# Patient Record
Sex: MALE | Race: OTHER | HISPANIC OR LATINO | Employment: FULL TIME | URBAN - METROPOLITAN AREA
[De-identification: names, ages, dates, MRNs, and addresses within clinical notes are randomized per-mention and may not be internally consistent; named-entity substitution may affect disease eponyms.]

---

## 2018-08-22 ENCOUNTER — OFFICE VISIT (OUTPATIENT)
Dept: URGENT CARE | Age: 38
End: 2018-08-22
Payer: COMMERCIAL

## 2018-08-22 VITALS
BODY MASS INDEX: 34.61 KG/M2 | HEART RATE: 72 BPM | RESPIRATION RATE: 18 BRPM | SYSTOLIC BLOOD PRESSURE: 134 MMHG | HEIGHT: 69 IN | WEIGHT: 233.69 LBS | DIASTOLIC BLOOD PRESSURE: 85 MMHG | OXYGEN SATURATION: 95 % | TEMPERATURE: 98 F

## 2018-08-22 DIAGNOSIS — J06.9 ACUTE URI: Primary | ICD-10-CM

## 2018-08-22 PROCEDURE — G0383 LEV 4 HOSP TYPE B ED VISIT: HCPCS | Performed by: FAMILY MEDICINE

## 2018-08-22 RX ORDER — BENZONATATE 100 MG/1
100 CAPSULE ORAL 3 TIMES DAILY PRN
Qty: 20 CAPSULE | Refills: 0 | Status: SHIPPED | OUTPATIENT
Start: 2018-08-22 | End: 2018-10-22 | Stop reason: ALTCHOICE

## 2018-08-22 NOTE — PATIENT INSTRUCTIONS
As we discussed your illness is viral   No antibiotics are indicated at this time  Rest and drink extra fluids  Cough medication as prescribed  Tylenol or Motrin as needed for pain or fever  Salt water gargles and throat lozenges for sore throat  Follow up with PCP if no improvement  Go to ER with worsening symptoms, fevers or worsening cough  Cold Symptoms   WHAT YOU NEED TO KNOW:   A cold is an infection caused by a virus  The infection causes your upper respiratory system to become inflamed  Common symptoms of a cold include sneezing, dry throat, a stuffy nose, headache, watery eyes, and a cough  Your cough may be dry, or you may cough up mucus  You may also have muscle aches, joint pain, and tiredness  Rarely, you may have a fever  Most colds go away without treatment  DISCHARGE INSTRUCTIONS:   Return to the emergency department if:   · You have increased tiredness and weakness  · You are unable to eat  · Your heart is beating much faster than usual for you  · You see white spots in the back of your throat and your neck is swollen and sore to the touch  · You see pinpoint or larger reddish-purple dots on your skin  Contact your healthcare provider if:   · You have a fever higher than 102°F (38 9°C)  · You have new or worsening shortness of breath  · You have thick nasal drainage for more than 2 days  · Your symptoms do not improve or get worse within 5 days  · You have questions or concerns about your condition or care  Medicines: The following medicines may be suggested by your healthcare provider to decrease your cold symptoms  These medicines are available without a doctor's order  Ask which medicines to take and when to take them  Follow directions  · NSAIDs or acetaminophen  help to bring down a fever or decrease pain  · Decongestants  help decrease nasal stuffiness  · Antihistamines  help decrease sneezing and a runny nose       · Cough suppressants  help decrease how much you cough  · Expectorants  help loosen mucus so you can cough it up  · Take your medicine as directed  Contact your healthcare provider if you think your medicine is not helping or if you have side effects  Tell him of her if you are allergic to any medicine  Keep a list of the medicines, vitamins, and herbs you take  Include the amounts, and when and why you take them  Bring the list or the pill bottles to follow-up visits  Carry your medicine list with you in case of an emergency  Symptom relief: The following may help relieve cold symptoms, such as a dry throat and congestion:  · Gargle with mouthwash or warm salt water as directed  · Suck on throat lozenges or hard candy  · Use a cold or warm vaporizer or humidifier to ease your breathing  · Rest for at least 2 days and then as needed to decrease tiredness and weakness  · Use petroleum based jelly around your nostrils to decrease irritation from blowing your nose  Drink liquids:  Liquids will help thin and loosen thick mucus so you can cough it up  Liquids will also keep you hydrated  Ask your healthcare provider which liquids are best for you and how much to drink each day  Prevent the spread of germs: You can spread your cold germs to others for at least 3 days after your symptoms start  Wash your hands often  Do not share items, such as eating utensils  Cover your nose and mouth when you cough or sneeze using the crook of your elbow instead of your hands  Throw used tissues in the garbage  Do not smoke:  Smoking may worsen your symptoms and increase the length of time you feel sick  Talk with your healthcare provider if you need help to stop smoking  Follow up with your healthcare provider as directed:  Write down your questions so you remember to ask them during your visits     © 2017 [employer] Information is for End User's use only and may not be sold, redistributed or otherwise used for commercial purposes  All illustrations and images included in CareNotes® are the copyrighted property of [employer]  or [employer]  The above information is an  only  It is not intended as medical advice for individual conditions or treatments  Talk to your doctor, nurse or pharmacist before following any medical regimen to see if it is safe and effective for you

## 2018-08-22 NOTE — PROGRESS NOTES
[facility]        NAME: Isaiah Rodríguez is a 43 y o  male  : 1980    MRN: [id number]  DATE: 2018  TIME: 11:06 AM    Assessment and Plan   Acute URI [J06 9]  1  Acute URI  benzonatate (TESSALON PERLES) 100 mg capsule         Patient Instructions     Patient Instructions   As we discussed your illness is viral   No antibiotics are indicated at this time  Rest and drink extra fluids  Cough medication as prescribed  Tylenol or Motrin as needed for pain or fever  Salt water gargles and throat lozenges for sore throat  Follow up with PCP if no improvement  Go to ER with worsening symptoms, fevers or worsening cough  Chief Complaint     Chief Complaint   Patient presents with    Cold Like Symptoms     x 5 days, cough ,chest congestion and runny nose  History of Present Illness   Isaiah Rodríguez presents to the clinic c/o    This is a 43year old male here today with cough and congestion  Symptoms started about 5 days ago  No fevers  He states he has had some wheezing in his chest   He states today he is feeling better  He denies ear pain or sore throat  Using OTC cough and cold medication as needed  Wife and daughter have similar symptoms  Review of Systems   Review of Systems   Constitutional: Positive for fatigue  Negative for fever  HENT: Positive for congestion  Negative for sinus pain and sinus pressure  Respiratory: Positive for cough and wheezing  Negative for chest tightness  Gastrointestinal: Negative  Skin: Negative  Neurological: Negative  Psychiatric/Behavioral: Negative  Current Medications     No long-term prescriptions on file         Current Allergies     Allergies as of 2018    (No Known Allergies)            The following portions of the patient's history were reviewed and updated as appropriate: allergies, current medications, past family history, past medical history, past social history, past surgical history and problem list     Objective   /85   Pulse 72   Temp 98 °F (36 7 °C) (Temporal)   Resp 18   Ht 5' 9 29" (1 76 m)   Wt 106 kg (233 lb 11 oz)   SpO2 95%   BMI 34 22 kg/m²        Physical Exam     Physical Exam   Constitutional: He is oriented to person, place, and time  He appears well-developed  HENT:   Head: Normocephalic  Right Ear: External ear normal    Left Ear: External ear normal    Mouth/Throat: Oropharynx is clear and moist    Neck: Normal range of motion  Neck supple  Cardiovascular: Normal rate, regular rhythm and normal heart sounds  Pulmonary/Chest: Effort normal and breath sounds normal    Neurological: He is alert and oriented to person, place, and time  Psychiatric: He has a normal mood and affect  His behavior is normal    Nursing note and vitals reviewed

## 2018-10-22 ENCOUNTER — OFFICE VISIT (OUTPATIENT)
Dept: FAMILY MEDICINE CLINIC | Facility: CLINIC | Age: 38
End: 2018-10-22
Payer: COMMERCIAL

## 2018-10-22 VITALS
HEIGHT: 69 IN | RESPIRATION RATE: 18 BRPM | OXYGEN SATURATION: 96 % | HEART RATE: 92 BPM | TEMPERATURE: 98.4 F | WEIGHT: 247.8 LBS | DIASTOLIC BLOOD PRESSURE: 82 MMHG | SYSTOLIC BLOOD PRESSURE: 130 MMHG | BODY MASS INDEX: 36.7 KG/M2

## 2018-10-22 DIAGNOSIS — K52.9 GASTROENTERITIS: Primary | ICD-10-CM

## 2018-10-22 PROCEDURE — 99202 OFFICE O/P NEW SF 15 MIN: CPT | Performed by: FAMILY MEDICINE

## 2018-10-22 PROCEDURE — 1036F TOBACCO NON-USER: CPT | Performed by: FAMILY MEDICINE

## 2018-10-22 PROCEDURE — 3008F BODY MASS INDEX DOCD: CPT | Performed by: FAMILY MEDICINE

## 2018-10-22 NOTE — PROGRESS NOTES
Assessment/Plan:      Diagnoses and all orders for this visit:    Gastroenteritis  Discussed with the patient that his symptoms are most likely caused by gastroenteritis  Patient's symptoms have improved  Patient instructed to make sure that he is drinking plenty of fluids  Patient instructed to eat a bland diet for the next few days  Patient instructed to follow-up if symptoms get worse or do not continue to get better  Patient instructed to follow-up for a wellness visit  Subjective:     Patient ID: Silvino Moran is a 43 y o  male  Patient is here to establish care  Patient reports upset stomach and that he vomited once on Thursday night  Patient reports that his lunch might not have been fresh  Patient reports watery diarrhea since Friday morning  Patient reports that he was having diarrhea about 7 times a day for the past few days  Denies any fever, nausea, abdominal pain, or blood in the stool  Patient reports that he drank pedialyte OTC  Patient reports that he has been taking a probiotic OTC  PPatient reports that he has been watching his diet  Patient reports that he has not had diarrhea since last night  Patient reports that he feels better today  Review of Systems   Constitutional: Negative for chills and fever  HENT: Negative for congestion, ear pain, sinus pressure, sore throat and trouble swallowing  Eyes: Negative for pain, discharge and redness  Respiratory: Negative for cough, chest tightness, shortness of breath and wheezing  Cardiovascular: Negative for chest pain, palpitations and leg swelling  Gastrointestinal:        As noted in HPI  Genitourinary: Negative for dysuria, frequency, hematuria and urgency  Musculoskeletal: Negative for arthralgias, myalgias and neck pain  Skin: Negative for rash  Neurological: Negative for dizziness, seizures, syncope, light-headedness, numbness and headaches     Psychiatric/Behavioral: Negative for suicidal ideas         Denies any depression  Objective:     Physical Exam   Constitutional: He is oriented to person, place, and time  No distress  HENT:   Right Ear: External ear normal    Left Ear: External ear normal    Nose: Nose normal    Mouth/Throat: Oropharynx is clear and moist    Tympanic membranes and ear canals wnl  Eyes: Pupils are equal, round, and reactive to light  Conjunctivae are normal  Right eye exhibits no discharge  Left eye exhibits no discharge  Neck: Normal range of motion  Cardiovascular: Normal rate, regular rhythm, normal heart sounds and intact distal pulses  No edema noted  Pulmonary/Chest: Effort normal and breath sounds normal  No respiratory distress  He has no wheezes  Abdominal: Soft  Bowel sounds are normal  He exhibits no distension and no mass  There is no tenderness  Musculoskeletal:   Gait wnl  Lymphadenopathy:     He has no cervical adenopathy  Neurological: He is alert and oriented to person, place, and time  No cranial nerve deficit  Skin: No rash noted  Psychiatric: He has a normal mood and affect  Vitals reviewed

## 2019-08-08 ENCOUNTER — OFFICE VISIT (OUTPATIENT)
Dept: FAMILY MEDICINE CLINIC | Facility: CLINIC | Age: 39
End: 2019-08-08
Payer: COMMERCIAL

## 2019-08-08 VITALS
RESPIRATION RATE: 16 BRPM | HEIGHT: 69 IN | DIASTOLIC BLOOD PRESSURE: 84 MMHG | OXYGEN SATURATION: 98 % | WEIGHT: 249 LBS | HEART RATE: 76 BPM | BODY MASS INDEX: 36.88 KG/M2 | SYSTOLIC BLOOD PRESSURE: 122 MMHG

## 2019-08-08 DIAGNOSIS — F41.9 ANXIETY DISORDER, UNSPECIFIED TYPE: Primary | ICD-10-CM

## 2019-08-08 DIAGNOSIS — F51.04 PSYCHOPHYSIOLOGICAL INSOMNIA: ICD-10-CM

## 2019-08-08 DIAGNOSIS — Z00.00 PHYSICAL EXAM, ANNUAL: ICD-10-CM

## 2019-08-08 PROBLEM — G47.00 INSOMNIA: Status: ACTIVE | Noted: 2019-08-08

## 2019-08-08 PROBLEM — K52.9 GASTROENTERITIS: Status: RESOLVED | Noted: 2018-10-22 | Resolved: 2019-08-08

## 2019-08-08 PROCEDURE — 1036F TOBACCO NON-USER: CPT | Performed by: FAMILY MEDICINE

## 2019-08-08 PROCEDURE — 3008F BODY MASS INDEX DOCD: CPT | Performed by: FAMILY MEDICINE

## 2019-08-08 PROCEDURE — 99214 OFFICE O/P EST MOD 30 MIN: CPT | Performed by: FAMILY MEDICINE

## 2019-08-08 RX ORDER — LORAZEPAM 0.5 MG/1
0.5 TABLET ORAL EVERY 8 HOURS PRN
Qty: 30 TABLET | Refills: 0 | Status: SHIPPED | OUTPATIENT
Start: 2019-08-08 | End: 2019-09-06

## 2019-08-08 RX ORDER — SERTRALINE HYDROCHLORIDE 25 MG/1
TABLET, FILM COATED ORAL
Qty: 30 TABLET | Refills: 1 | Status: SHIPPED | OUTPATIENT
Start: 2019-08-08 | End: 2019-09-06 | Stop reason: SDUPTHER

## 2019-08-08 NOTE — ASSESSMENT & PLAN NOTE
- long discussion with patient in regards to anxiety triggers and things that he is able to control  Obviously his daughter is in  so she is being supervised throughout the course of the day which the patient has to be accepting of    -patient will continue to have virtual visits with his therapist but will be started on sertraline 12 5 mg once daily x1 week then increasing to 25 mg once daily  He was given a limited supply of p r n  Lorazepam to take as needed for anxiety attacks as well as insomnia  -patient will follow up in 2-3 weeks for re-evaluation of anxiety and insomnia      - check thyroid function study, electrolyte panel, CBC as we do not have any records of any recent blood work for the patient

## 2019-08-08 NOTE — PROGRESS NOTES
Subjective:      Patient ID: Willy Vázquez is a 35 y o  male  50-year-old male presents for evaluation generalized anxiety disorder and panic attacks  Patient states that he has a longstanding history of anxiety dating back to 2010  Patient does have a therapist that he does interact with on a regular basis from [facility]  Patient had moved to the [place] from [facility] 2 years ago for work but has kept in touch with his therapist through virtual visits  Patient has never been on anti anxiety medication  Patient states that 1 of the triggers for his anxiety is that he likes to be in control situations  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  Patient states that his father recently passed away in December this past year and he has a 3-3/1year-old daughter who has been diagnosed with asthma  His daughter has had several episodes being diagnosed with pneumonia  Daughter intends  and the patient works from home  He states that many times he is finding himself contacting the  or checking on a phone after to make certain that his daughter is doing well  He has difficulty sleeping at night and feels very restless  Patient has never had blood work performed carotids  Patient states that last set of labs were done approximately 2 years ago before he immigrated to the [facility]      No past medical history on file  Family History   Problem Relation Age of Onset    Diabetes Paternal Grandmother     Hypertension Mother     Anxiety disorder Mother     Dementia Father     Autism Brother        Past Surgical History:   Procedure Laterality Date    CHOLECYSTECTOMY          reports that he has never smoked  He has never used smokeless tobacco  He reports that he drinks alcohol  He reports that he does not use drugs        Current Outpatient Medications:     LORazepam (ATIVAN) 0 5 mg tablet, Take 1 tablet (0 5 mg total) by mouth every 8 (eight) hours as needed for anxiety, Disp: 30 tablet, Rfl: 0    sertraline (ZOLOFT) 25 mg tablet, 1/2 tablet daily x7 days then 1 tablet daily, Disp: 30 tablet, Rfl: 1    The following portions of the patient's history were reviewed and updated as appropriate: allergies, current medications, past family history, past medical history, past social history, past surgical history and problem list     Review of Systems   Constitutional: Negative  Cardiovascular: Negative for palpitations  Psychiatric/Behavioral: Positive for dysphoric mood and sleep disturbance  The patient is nervous/anxious  Objective:    /84   Pulse 76   Resp 16   Ht 5' 9" (1 753 m)   Wt 113 kg (249 lb)   SpO2 98%   BMI 36 77 kg/m²      Physical Exam   Constitutional: He is oriented to person, place, and time  He appears well-developed and well-nourished  Obese   Eyes: Pupils are equal, round, and reactive to light  Conjunctivae and EOM are normal    Neck: Normal range of motion  Neck supple  No thyromegaly present  Cardiovascular: Normal rate, regular rhythm and normal heart sounds  Neurological: He is alert and oriented to person, place, and time  Psychiatric: He has a normal mood and affect  His behavior is normal  Judgment and thought content normal    Nursing note and vitals reviewed  No results found for this or any previous visit (from the past 1008 hour(s))  Assessment/Plan:    Anxiety disorder  - long discussion with patient in regards to anxiety triggers and things that he is able to control  Obviously his daughter is in  so she is being supervised throughout the course of the day which the patient has to be accepting of    -patient will continue to have virtual visits with his therapist but will be started on sertraline 12 5 mg once daily x1 week then increasing to 25 mg once daily  He was given a limited supply of p r n  Lorazepam to take as needed for anxiety attacks as well as insomnia      -patient will follow up in 2-3 weeks for re-evaluation of anxiety and insomnia  - check thyroid function study, electrolyte panel, CBC as we do not have any records of any recent blood work for the patient    Insomnia  - insomnia is likely related to his generalized anxiety disorder  Hopefully in treating his generalized anxiety disorder with SSRI than his insomnia will improve  He was given limited supply of p r n  Lorazepam which can be taken both for anxiety as well as insomnia    -Prior to prescribing the controlled substance, a patient search was performed on the [place] prescription drug monitoring program web site  There was no evidence of diversion or misuse  Prescription provided            Problem List Items Addressed This Visit        Other    Anxiety disorder - Primary     - long discussion with patient in regards to anxiety triggers and things that he is able to control  Obviously his daughter is in  so she is being supervised throughout the course of the day which the patient has to be accepting of    -patient will continue to have virtual visits with his therapist but will be started on sertraline 12 5 mg once daily x1 week then increasing to 25 mg once daily  He was given a limited supply of p r n  Lorazepam to take as needed for anxiety attacks as well as insomnia  -patient will follow up in 2-3 weeks for re-evaluation of anxiety and insomnia  - check thyroid function study, electrolyte panel, CBC as we do not have any records of any recent blood work for the patient         Relevant Medications    sertraline (ZOLOFT) 25 mg tablet    LORazepam (ATIVAN) 0 5 mg tablet    Other Relevant Orders    TSH, 3rd generation with Free T4 reflex    Insomnia     - insomnia is likely related to his generalized anxiety disorder  Hopefully in treating his generalized anxiety disorder with SSRI than his insomnia will improve  He was given limited supply of p r n   Lorazepam which can be taken both for anxiety as well as insomnia    -Prior to prescribing the controlled substance, a patient search was performed on the [place] prescription drug monitoring program web site  There was no evidence of diversion or misuse    Prescription provided           Relevant Medications    sertraline (ZOLOFT) 25 mg tablet    LORazepam (ATIVAN) 0 5 mg tablet      Other Visit Diagnoses     Physical exam, annual        Relevant Orders    CBC and differential    Comprehensive metabolic panel    Lipid panel

## 2019-08-08 NOTE — ASSESSMENT & PLAN NOTE
- insomnia is likely related to his generalized anxiety disorder  Hopefully in treating his generalized anxiety disorder with SSRI than his insomnia will improve  He was given limited supply of p r n  Lorazepam which can be taken both for anxiety as well as insomnia    -Prior to prescribing the controlled substance, a patient search was performed on the [place] prescription drug monitoring program web site  There was no evidence of diversion or misuse    Prescription provided

## 2019-08-18 LAB
ALBUMIN SERPL-MCNC: 4.7 G/DL (ref 3.6–5.1)
ALBUMIN/GLOB SERPL: 1.7 (CALC) (ref 1–2.5)
ALP SERPL-CCNC: 66 U/L (ref 40–115)
ALT SERPL-CCNC: 26 U/L (ref 9–46)
AST SERPL-CCNC: 20 U/L (ref 10–40)
BASOPHILS # BLD AUTO: 89 CELLS/UL (ref 0–200)
BASOPHILS NFR BLD AUTO: 0.9 %
BILIRUB SERPL-MCNC: 0.4 MG/DL (ref 0.2–1.2)
BUN SERPL-MCNC: 17 MG/DL (ref 7–25)
BUN/CREAT SERPL: NORMAL (CALC) (ref 6–22)
CALCIUM SERPL-MCNC: 9.9 MG/DL (ref 8.6–10.3)
CHLORIDE SERPL-SCNC: 103 MMOL/L (ref 98–110)
CHOLEST SERPL-MCNC: 168 MG/DL
CHOLEST/HDLC SERPL: 4.2 (CALC)
CO2 SERPL-SCNC: 28 MMOL/L (ref 20–32)
CREAT SERPL-MCNC: 0.95 MG/DL (ref 0.6–1.35)
EOSINOPHIL # BLD AUTO: 188 CELLS/UL (ref 15–500)
EOSINOPHIL NFR BLD AUTO: 1.9 %
ERYTHROCYTE [DISTWIDTH] IN BLOOD BY AUTOMATED COUNT: 13.1 % (ref 11–15)
GLOBULIN SER CALC-MCNC: 2.8 G/DL (CALC) (ref 1.9–3.7)
GLUCOSE SERPL-MCNC: 83 MG/DL (ref 65–99)
HCT VFR BLD AUTO: 40.3 % (ref 38.5–50)
HDLC SERPL-MCNC: 40 MG/DL
HGB BLD-MCNC: 13.3 G/DL (ref 13.2–17.1)
LDLC SERPL CALC-MCNC: 105 MG/DL (CALC)
LYMPHOCYTES # BLD AUTO: 2960 CELLS/UL (ref 850–3900)
LYMPHOCYTES NFR BLD AUTO: 29.9 %
MCH RBC QN AUTO: 28 PG (ref 27–33)
MCHC RBC AUTO-ENTMCNC: 33 G/DL (ref 32–36)
MCV RBC AUTO: 84.8 FL (ref 80–100)
MONOCYTES # BLD AUTO: 723 CELLS/UL (ref 200–950)
MONOCYTES NFR BLD AUTO: 7.3 %
NEUTROPHILS # BLD AUTO: 5940 CELLS/UL (ref 1500–7800)
NEUTROPHILS NFR BLD AUTO: 60 %
NONHDLC SERPL-MCNC: 128 MG/DL (CALC)
PLATELET # BLD AUTO: 364 THOUSAND/UL (ref 140–400)
PMV BLD REES-ECKER: 10.5 FL (ref 7.5–12.5)
POTASSIUM SERPL-SCNC: 4.7 MMOL/L (ref 3.5–5.3)
PROT SERPL-MCNC: 7.5 G/DL (ref 6.1–8.1)
RBC # BLD AUTO: 4.75 MILLION/UL (ref 4.2–5.8)
SL AMB EGFR AFRICAN AMERICAN: 116 ML/MIN/1.73M2
SL AMB EGFR NON AFRICAN AMERICAN: 100 ML/MIN/1.73M2
SODIUM SERPL-SCNC: 139 MMOL/L (ref 135–146)
TRIGL SERPL-MCNC: 134 MG/DL
TSH SERPL-ACNC: 3.17 MIU/L (ref 0.4–4.5)
WBC # BLD AUTO: 9.9 THOUSAND/UL (ref 3.8–10.8)

## 2019-09-06 ENCOUNTER — OFFICE VISIT (OUTPATIENT)
Dept: FAMILY MEDICINE CLINIC | Facility: CLINIC | Age: 39
End: 2019-09-06
Payer: COMMERCIAL

## 2019-09-06 VITALS
HEART RATE: 90 BPM | RESPIRATION RATE: 16 BRPM | BODY MASS INDEX: 35.99 KG/M2 | HEIGHT: 69 IN | OXYGEN SATURATION: 98 % | DIASTOLIC BLOOD PRESSURE: 72 MMHG | SYSTOLIC BLOOD PRESSURE: 138 MMHG | WEIGHT: 243 LBS

## 2019-09-06 DIAGNOSIS — F41.9 ANXIETY DISORDER, UNSPECIFIED TYPE: ICD-10-CM

## 2019-09-06 DIAGNOSIS — F51.04 PSYCHOPHYSIOLOGICAL INSOMNIA: ICD-10-CM

## 2019-09-06 PROCEDURE — 99213 OFFICE O/P EST LOW 20 MIN: CPT | Performed by: FAMILY MEDICINE

## 2019-09-06 PROCEDURE — 3008F BODY MASS INDEX DOCD: CPT | Performed by: FAMILY MEDICINE

## 2019-09-06 NOTE — PROGRESS NOTES
Subjective:      Patient ID: Richelle Meeks is a 35 y o  male  Patient presents for follow-up in regards to generalized anxiety disorder  Patient states that the Zoloft has been effective for him  Initially when he 1st started taking medication he did have slight dizziness but that has subsided  He feels that he has less anxiety and is having less catastrophic thoughts though still with occasional episode  He only took the lorazepam twice since he was last seen  No homicidal or suicidal ideation  Patient also forgot to mention that at the end job position was transferred to [place]  Patient will need a enough of the supply until he can establish medical care in [place]      No past medical history on file  Family History   Problem Relation Age of Onset    Diabetes Paternal Grandmother     Hypertension Mother     Anxiety disorder Mother     Dementia Father     Autism Brother        Past Surgical History:   Procedure Laterality Date    CHOLECYSTECTOMY          reports that he has never smoked  He has never used smokeless tobacco  He reports that he drinks alcohol  He reports that he does not use drugs  Current Outpatient Medications:     sertraline (ZOLOFT) 50 mg tablet, Take 1 tablet (50 mg total) by mouth daily 1 tablet daily, Disp: 90 tablet, Rfl: 0    The following portions of the patient's history were reviewed and updated as appropriate: allergies, current medications, past family history, past medical history, past social history, past surgical history and problem list     Review of Systems   Constitutional: Negative  Psychiatric/Behavioral: Negative for decreased concentration, self-injury, sleep disturbance and suicidal ideas  The patient is nervous/anxious  Objective:    /72   Pulse 90   Resp 16   Ht 5' 9" (1 753 m)   Wt 110 kg (243 lb)   SpO2 98%   BMI 35 88 kg/m²      Physical Exam   Constitutional: He appears well-developed and well-nourished  Cardiovascular: Normal rate, regular rhythm and normal heart sounds  Psychiatric: He has a normal mood and affect  His behavior is normal  Judgment and thought content normal    Nursing note and vitals reviewed          Recent Results (from the past 1008 hour(s))   Lipid panel    Collection Time: 08/17/19 10:52 AM   Result Value Ref Range    Total Cholesterol 168 <200 mg/dL    HDL 40 (L) >40 mg/dL    Triglycerides 134 <150 mg/dL    LDL Direct 105 (H) mg/dL (calc)    Chol HDLC Ratio 4 2 <5 0 (calc)    Non-HDL Cholesterol 128 <130 mg/dL (calc)   Comprehensive metabolic panel    Collection Time: 08/17/19 10:52 AM   Result Value Ref Range    Glucose, Random 83 65 - 99 mg/dL    BUN 17 7 - 25 mg/dL    Creatinine 0 95 0 60 - 1 35 mg/dL    eGFR Non  100 > OR = 60 mL/min/1 73m2    eGFR  116 > OR = 60 mL/min/1 73m2    SL AMB BUN/CREATININE RATIO NOT APPLICABLE 6 - 22 (calc)    Sodium 139 135 - 146 mmol/L    Potassium 4 7 3 5 - 5 3 mmol/L    Chloride 103 98 - 110 mmol/L    CO2 28 20 - 32 mmol/L    SL AMB CALCIUM 9 9 8 6 - 10 3 mg/dL    Protein, Total 7 5 6 1 - 8 1 g/dL    Albumin 4 7 3 6 - 5 1 g/dL    Globulin 2 8 1 9 - 3 7 g/dL (calc)    Albumin/Globulin Ratio 1 7 1 0 - 2 5 (calc)    TOTAL BILIRUBIN 0 4 0 2 - 1 2 mg/dL    Alkaline Phosphatase 66 40 - 115 U/L    AST 20 10 - 40 U/L    ALT 26 9 - 46 U/L   CBC and differential    Collection Time: 08/17/19 10:52 AM   Result Value Ref Range    White Blood Cell Count 9 9 3 8 - 10 8 Thousand/uL    Red Blood Cell Count 4 75 4 20 - 5 80 Million/uL    Hemoglobin 13 3 13 2 - 17 1 g/dL    HCT 40 3 38 5 - 50 0 %    MCV 84 8 80 0 - 100 0 fL    MCH 28 0 27 0 - 33 0 pg    MCHC 33 0 32 0 - 36 0 g/dL    RDW 13 1 11 0 - 15 0 %    Platelet Count 787 594 - 400 Thousand/uL    SL AMB MPV 10 5 7 5 - 12 5 fL    Neutrophils (Absolute) 5,940 1,500 - 7,800 cells/uL    Lymphocytes (Absolute) 2,960 850 - 3,900 cells/uL    Monocytes (Absolute) 723 200 - 950 cells/uL Eosinophils (Absolute) 188 15 - 500 cells/uL    Basophils ABS 89 0 - 200 cells/uL    Neutrophils 60 %    Lymphocytes 29 9 %    Monocytes 7 3 %    Eosinophils 1 9 %    Basophils PCT 0 9 %    Always Message     TSH, 3rd generation with Free T4 reflex    Collection Time: 08/17/19 10:52 AM   Result Value Ref Range    TSH W/RFX TO FREE T4 3 17 0 40 - 4 50 mIU/L       Assessment/Plan:    Anxiety disorder  - patient did have normal labs  Labs reviewed  Normal thyroid function studies    -patient has had significant benefit in his anxiety since being started on Zoloft  Patient still has occasional episodes of anxiety  Will increase dose of sertraline to 50 mg once daily  Patient can try to increase his dosage and see if his anxiety continues to improve or if he finds that there is no significant difference he can split his tabs which should give him enough of a supply on until he establishes with medical provider in [place]          Problem List Items Addressed This Visit        Other    Anxiety disorder     - patient did have normal labs  Labs reviewed  Normal thyroid function studies    -patient has had significant benefit in his anxiety since being started on Zoloft  Patient still has occasional episodes of anxiety  Will increase dose of sertraline to 50 mg once daily  Patient can try to increase his dosage and see if his anxiety continues to improve or if he finds that there is no significant difference he can split his tabs which should give him enough of a supply on until he establishes with medical provider in [place]         Relevant Medications    sertraline (ZOLOFT) 50 mg tablet    Insomnia    Relevant Medications    sertraline (ZOLOFT) 50 mg tablet            BMI Counseling: Body mass index is 35 88 kg/m²  Discussed the patient's BMI with him  The BMI is above average  BMI counseling and education was provided to the patient   Exercise recommendations include moderate aerobic physical activity for 150 minutes/week

## 2019-09-06 NOTE — ASSESSMENT & PLAN NOTE
- patient did have normal labs  Labs reviewed  Normal thyroid function studies    -patient has had significant benefit in his anxiety since being started on Zoloft  Patient still has occasional episodes of anxiety  Will increase dose of sertraline to 50 mg once daily    Patient can try to increase his dosage and see if his anxiety continues to improve or if he finds that there is no significant difference he can split his tabs which should give him enough of a supply on until he establishes with medical provider in [place]

## 2019-09-23 ENCOUNTER — IMMUNIZATIONS (OUTPATIENT)
Dept: FAMILY MEDICINE CLINIC | Facility: CLINIC | Age: 39
End: 2019-09-23
Payer: COMMERCIAL

## 2019-09-23 DIAGNOSIS — Z23 ENCOUNTER FOR IMMUNIZATION: ICD-10-CM

## 2019-09-23 PROCEDURE — 90471 IMMUNIZATION ADMIN: CPT

## 2019-09-23 PROCEDURE — 90686 IIV4 VACC NO PRSV 0.5 ML IM: CPT
